# Patient Record
Sex: MALE | Race: ASIAN | Employment: UNEMPLOYED | URBAN - METROPOLITAN AREA
[De-identification: names, ages, dates, MRNs, and addresses within clinical notes are randomized per-mention and may not be internally consistent; named-entity substitution may affect disease eponyms.]

---

## 2021-12-22 ENCOUNTER — IMMUNIZATION (OUTPATIENT)
Dept: NURSING | Facility: CLINIC | Age: 31
End: 2021-12-22
Payer: OTHER GOVERNMENT

## 2021-12-22 DIAGNOSIS — Z23 HIGH PRIORITY FOR 2019-NCOV VACCINE: Primary | ICD-10-CM

## 2021-12-22 PROCEDURE — 0064A COVID-19,PF,MODERNA (18+ YRS BOOSTER .25ML): CPT

## 2021-12-22 PROCEDURE — 91306 COVID-19,PF,MODERNA (18+ YRS BOOSTER .25ML): CPT

## 2021-12-22 PROCEDURE — 99207 PR NO CHARGE NURSE ONLY: CPT

## 2022-02-16 ENCOUNTER — OFFICE VISIT (OUTPATIENT)
Dept: URGENT CARE | Facility: URGENT CARE | Age: 32
End: 2022-02-16

## 2022-02-16 VITALS
DIASTOLIC BLOOD PRESSURE: 104 MMHG | SYSTOLIC BLOOD PRESSURE: 147 MMHG | BODY MASS INDEX: 28.85 KG/M2 | OXYGEN SATURATION: 96 % | WEIGHT: 198.2 LBS | HEART RATE: 89 BPM | TEMPERATURE: 98 F

## 2022-02-16 DIAGNOSIS — R10.84 ABDOMINAL PAIN, GENERALIZED: Primary | ICD-10-CM

## 2022-02-16 PROCEDURE — 99204 OFFICE O/P NEW MOD 45 MIN: CPT | Performed by: PHYSICIAN ASSISTANT

## 2022-02-16 ASSESSMENT — ENCOUNTER SYMPTOMS
WHEEZING: 0
FREQUENCY: 0
VOMITING: 0
LIGHT-HEADEDNESS: 0
BACK PAIN: 1
SLEEP DISTURBANCE: 1
ALLERGIC/IMMUNOLOGIC NEGATIVE: 1
DIAPHORESIS: 0
HEMATURIA: 0
PALPITATIONS: 0
NECK STIFFNESS: 0
NECK PAIN: 0
COUGH: 0
APPETITE CHANGE: 1
DYSURIA: 0
SORE THROAT: 0
NEUROLOGICAL NEGATIVE: 1
DECREASED CONCENTRATION: 0
SHORTNESS OF BREATH: 0
CHEST TIGHTNESS: 0
FATIGUE: 0
FEVER: 0
CHILLS: 0
ABDOMINAL PAIN: 1
NAUSEA: 0
RESPIRATORY NEGATIVE: 1
COLOR CHANGE: 0
CARDIOVASCULAR NEGATIVE: 1
WEAKNESS: 0
HEADACHES: 0
DIARRHEA: 0
ACTIVITY CHANGE: 0
MYALGIAS: 1

## 2022-02-16 NOTE — PROGRESS NOTES
Chief Complaint:    Chief Complaint   Patient presents with     Abdominal Pain     Unable to take deep breaths without have pain in sternum area. Lower abdominal pain. Onset- Monday night, worse over time     Back Pain     Lower back pain      Rib pain     All ribs          Medical Decision Making:    Vital signs reviewed by Quinton Jones PA-C  BP (!) 147/104 (BP Location: Left arm, Patient Position: Sitting, Cuff Size: Adult Regular)   Pulse 89   Temp 98  F (36.7  C) (Tympanic)   Wt 89.9 kg (198 lb 3.2 oz)   SpO2 96%   BMI 28.85 kg/m      Differential Diagnosis:  Unspecified abdominal pain, pancreatitis, appendicitis, diverticulitis     ASSESSMENT:     1. Abdominal pain, generalized           PLAN:    Patient is in no acute distress. He is afebrile with stable vital signs.  He has tenderness in the abdomen in all 4 quadrants with some guarding.  No rebound tenderness.  He has bilateral CVA tenderness.  At this time I can not rule out acute pancreatitis, acute appendix, bowel ischemia, or early pyelonephritis.  Patient instructed to go to the ED now for further evaluation, labs, and imaging.  Patient verbalized understanding and agreed with this plan.  Patient was discharged in stable condition.    Labs:     No results found for any visits on 02/16/22.    Current Meds:  No current outpatient medications on file.    Allergies:  Allergies   Allergen Reactions     Penicillins        SUBJECTIVE    HPI: Millie Mcallister is an 31 year old male who presents for evaluation and treatment of abdominal pain. The pain started on Monday while he was bending over to pick something a bottle up at work. It has been continuous since then and seems to be gradually affecting a greater area. The pain is diffuse through the front of his abdomen and wraps around to his back. He describes it as similar to a sore muscle, with occasional sharp stabbing pain. The pain is exacerbated by taking deep breaths or laying on it. The patient has  not tried any treatments for the pain. The pain is only alleviated by sitting in certain positions. The patient denies changes to bowel movements, shortness of breath, chest pain, weakness in the extremities, or altered mental status. The patient denies recent infection or known sick contacts. The patient endorses loss of appetite.Patient denies any black tarry stools or blood in his stool.      Patient is new to [facility].    ROS:      Review of Systems   Constitutional: Positive for appetite change. Negative for activity change, chills, diaphoresis, fatigue and fever.   HENT: Negative.  Negative for sore throat.    Eyes: Negative for visual disturbance.   Respiratory: Negative.  Negative for cough, chest tightness, shortness of breath and wheezing.    Cardiovascular: Negative.  Negative for chest pain and palpitations.   Gastrointestinal: Positive for abdominal pain. Negative for diarrhea, nausea and vomiting.   Genitourinary: Negative for dysuria, frequency, hematuria and urgency.   Musculoskeletal: Positive for back pain and myalgias. Negative for neck pain and neck stiffness.   Skin: Negative for color change and rash.   Allergic/Immunologic: Negative.  Negative for immunocompromised state.   Neurological: Negative.  Negative for weakness, light-headedness and headaches.   Psychiatric/Behavioral: Positive for sleep disturbance. Negative for decreased concentration.        Family History   Family History   Problem Relation Age of Onset     Heart Disease Paternal Grandmother      Coronary Artery Disease Paternal Grandfather        Social History  Social History     Socioeconomic History     Marital status: Single     Spouse name: Not on file     Number of children: Not on file     Years of education: Not on file     Highest education level: Not on file   Occupational History     Not on file   Tobacco Use     Smoking status: Current Some Day Smoker     Smokeless tobacco: Not on file   Substance and Sexual  Activity     Alcohol use: Yes     Comment: occ.     Drug use: Not on file     Sexual activity: Yes     Partners: Female   Other Topics Concern     Parent/sibling w/ CABG, MI or angioplasty before 65F 55M? Not Asked   Social History Narrative     Not on file     Social Determinants of Health     Financial Resource Strain: Not on file   Food Insecurity: Not on file   Transportation Needs: Not on file   Physical Activity: Not on file   Stress: Not on file   Social Connections: Not on file   Intimate Partner Violence: Not on file   Housing Stability: Not on file        Surgical History:  No past surgical history on file.     Problem List:  There is no problem list on file for this patient.          OBJECTIVE:     Vital signs noted and reviewed by Quinton Jones PA-C  BP (!) 147/104 (BP Location: Left arm, Patient Position: Sitting, Cuff Size: Adult Regular)   Pulse 89   Temp 98  F (36.7  C) (Tympanic)   Wt 89.9 kg (198 lb 3.2 oz)   SpO2 96%   BMI 28.85 kg/m       PEFR:    Physical Exam  Vitals and nursing note reviewed.   Constitutional:       General: He is not in acute distress.     Appearance: He is well-developed. He is not ill-appearing, toxic-appearing or diaphoretic.   HENT:      Head: Normocephalic and atraumatic.      Right Ear: Hearing, tympanic membrane, ear canal and external ear normal. Tympanic membrane is not perforated, erythematous, retracted or bulging.      Left Ear: Hearing, tympanic membrane, ear canal and external ear normal. Tympanic membrane is not perforated, erythematous, retracted or bulging.      Nose: Nose normal. No mucosal edema, congestion or rhinorrhea.      Mouth/Throat:      Pharynx: No oropharyngeal exudate or posterior oropharyngeal erythema.      Tonsils: No tonsillar exudate or tonsillar abscesses. 0 on the right. 0 on the left.   Eyes:      Pupils: Pupils are equal, round, and reactive to light.   Cardiovascular:      Rate and Rhythm: Normal rate and regular rhythm.       Heart sounds: Normal heart sounds, S1 normal and S2 normal. Heart sounds not distant. No murmur heard.    No friction rub. No gallop.   Pulmonary:      Effort: Pulmonary effort is normal. No respiratory distress.      Breath sounds: Normal breath sounds. No stridor. No decreased breath sounds, wheezing, rhonchi or rales.   Chest:      Chest wall: No tenderness.   Abdominal:      General: Bowel sounds are normal. There is no distension.      Palpations: Abdomen is soft.      Tenderness: There is generalized abdominal tenderness. There is right CVA tenderness, left CVA tenderness and guarding. There is no rebound.   Musculoskeletal:         General: Tenderness present.        Arms:       Cervical back: Normal range of motion and neck supple. No rigidity or tenderness. Normal range of motion.      Thoracic back: No swelling or signs of trauma.      Lumbar back: Tenderness present.   Lymphadenopathy:      Cervical: No cervical adenopathy.   Skin:     General: Skin is warm and dry.      Findings: No bruising, erythema or rash.   Neurological:      Mental Status: He is alert.      Cranial Nerves: No cranial nerve deficit.      Sensory: No sensory deficit.      Motor: No weakness.      Coordination: Coordination normal.      Gait: Gait normal.   Psychiatric:         Attention and Perception: He is attentive.         Speech: Speech normal.         Behavior: Behavior normal. Behavior is cooperative.         Thought Content: Thought content normal.         Judgment: Judgment normal.             Quinton Jones PA-C  2/16/2022, 3:34 PM